# Patient Record
Sex: FEMALE | Race: WHITE
[De-identification: names, ages, dates, MRNs, and addresses within clinical notes are randomized per-mention and may not be internally consistent; named-entity substitution may affect disease eponyms.]

---

## 2021-02-10 ENCOUNTER — APPOINTMENT (OUTPATIENT)
Dept: BARIATRICS/WEIGHT MGMT | Facility: CLINIC | Age: 41
End: 2021-02-10
Payer: COMMERCIAL

## 2021-02-10 DIAGNOSIS — R53.83 OTHER FATIGUE: ICD-10-CM

## 2021-02-10 DIAGNOSIS — R79.0 ABNORMAL LVL OF BLOOD MINERAL: ICD-10-CM

## 2021-02-10 PROCEDURE — 99204 OFFICE O/P NEW MOD 45 MIN: CPT | Mod: 95

## 2021-02-11 PROBLEM — R79.0 DECREASED IRON STORES: Status: ACTIVE | Noted: 2021-02-11

## 2021-02-11 PROBLEM — R53.83 OTHER FATIGUE: Status: ACTIVE | Noted: 2021-02-11

## 2021-02-20 LAB
25(OH)D3 SERPL-MCNC: 34.1 NG/ML
ALBUMIN SERPL ELPH-MCNC: 4.4 G/DL
ALP BLD-CCNC: 53 U/L
ALT SERPL-CCNC: 10 U/L
ANION GAP SERPL CALC-SCNC: 10 MMOL/L
AST SERPL-CCNC: 17 U/L
BASOPHILS # BLD AUTO: 0.02 K/UL
BASOPHILS NFR BLD AUTO: 0.4 %
BILIRUB SERPL-MCNC: 0.2 MG/DL
BUN SERPL-MCNC: 13 MG/DL
CALCIUM SERPL-MCNC: 8.9 MG/DL
CHLORIDE SERPL-SCNC: 105 MMOL/L
CHOLEST SERPL-MCNC: 138 MG/DL
CO2 SERPL-SCNC: 25 MMOL/L
CREAT SERPL-MCNC: 0.77 MG/DL
CRP SERPL HS-MCNC: 3.04 MG/L
EOSINOPHIL # BLD AUTO: 0.06 K/UL
EOSINOPHIL NFR BLD AUTO: 1.1 %
FERRITIN SERPL-MCNC: 52 NG/ML
GLUCOSE SERPL-MCNC: 97 MG/DL
HCT VFR BLD CALC: 38 %
HDLC SERPL-MCNC: 64 MG/DL
HGB BLD-MCNC: 11.9 G/DL
IMM GRANULOCYTES NFR BLD AUTO: 0.2 %
INSULIN SERPL-MCNC: 9.9 UU/ML
IRON SATN MFR SERPL: 15 %
IRON SERPL-MCNC: 51 UG/DL
LDLC SERPL CALC-MCNC: 61 MG/DL
LYMPHOCYTES # BLD AUTO: 1.83 K/UL
LYMPHOCYTES NFR BLD AUTO: 32.4 %
MAN DIFF?: NORMAL
MCHC RBC-ENTMCNC: 30.2 PG
MCHC RBC-ENTMCNC: 31.3 GM/DL
MCV RBC AUTO: 96.4 FL
MONOCYTES # BLD AUTO: 0.56 K/UL
MONOCYTES NFR BLD AUTO: 9.9 %
NEUTROPHILS # BLD AUTO: 3.16 K/UL
NEUTROPHILS NFR BLD AUTO: 56 %
NONHDLC SERPL-MCNC: 74 MG/DL
PLATELET # BLD AUTO: 241 K/UL
POTASSIUM SERPL-SCNC: 4.5 MMOL/L
PROT SERPL-MCNC: 6.9 G/DL
RBC # BLD: 3.94 M/UL
RBC # FLD: 13.1 %
SODIUM SERPL-SCNC: 140 MMOL/L
T3FREE SERPL-MCNC: 2.77 PG/ML
T4 FREE SERPL-MCNC: 0.9 NG/DL
THYROGLOB AB SERPL-ACNC: <20 IU/ML
THYROPEROXIDASE AB SERPL IA-ACNC: 41.3 IU/ML
TIBC SERPL-MCNC: 329 UG/DL
TRIGL SERPL-MCNC: 66 MG/DL
TSH SERPL-ACNC: 4.5 UIU/ML
UIBC SERPL-MCNC: 278 UG/DL
WBC # FLD AUTO: 5.64 K/UL

## 2021-03-03 ENCOUNTER — APPOINTMENT (OUTPATIENT)
Dept: BARIATRICS/WEIGHT MGMT | Facility: CLINIC | Age: 41
End: 2021-03-03
Payer: COMMERCIAL

## 2021-03-03 PROCEDURE — 99214 OFFICE O/P EST MOD 30 MIN: CPT | Mod: 95

## 2021-03-08 ENCOUNTER — OUTPATIENT (OUTPATIENT)
Dept: OUTPATIENT SERVICES | Facility: HOSPITAL | Age: 41
LOS: 1 days | End: 2021-03-08
Payer: COMMERCIAL

## 2021-03-08 ENCOUNTER — APPOINTMENT (OUTPATIENT)
Dept: ULTRASOUND IMAGING | Facility: IMAGING CENTER | Age: 41
End: 2021-03-08
Payer: COMMERCIAL

## 2021-03-08 DIAGNOSIS — R94.6 ABNORMAL RESULTS OF THYROID FUNCTION STUDIES: ICD-10-CM

## 2021-03-08 PROCEDURE — 76536 US EXAM OF HEAD AND NECK: CPT

## 2021-03-08 PROCEDURE — 76536 US EXAM OF HEAD AND NECK: CPT | Mod: 26

## 2021-03-11 ENCOUNTER — TRANSCRIPTION ENCOUNTER (OUTPATIENT)
Age: 41
End: 2021-03-11

## 2021-03-28 NOTE — ASSESSMENT
[FreeTextEntry1] : 39 yo woman with class I obesity\par \par Recommend the following:\par check metabolic labs\par whole foods based eating strategy limiting refined carbs and added fats - recommend plant leaning approach\par ok to maintain 6-10 hour eating window but recommend being consistent with timing\par keep food journal\par track daily activity\par more moderate intensity cardio\par would like to avoid pharmacotherapy at this time\par rtc in 3 weeks.\par

## 2021-03-28 NOTE — HISTORY OF PRESENT ILLNESS
[Home] : at home, [unfilled] , at the time of the visit. [Other Location: e.g. Home (Enter Location, City,State)___] : at [unfilled] [Verbal consent obtained from patient] : the patient, [unfilled] [FreeTextEntry1] : 41 yo woman presents for initial eval and mgt of overweight/obesity\par \par She reports struggling with her weight in particular since having 3rd baby 2 years ago.  \par \par currently at her lifetime non-pregnant max weight of 185 lbs.  She is 5'7.  \par \par Now she is trying to engage in intermittent fasting.  She has a shake in the morning a light lunch/dinner\par She tends to snack in the afternoon on cookies or apple/pb \par Does eat out/order in weekly\par \par Sleep generally is ok although has been difficult over the past several weeks\par \par She engages in daily physical activity, walking, spinning strength training. spends more time of resistance work than cardio.  \par \par \par Please refer to intake forms for complete weight and related history.\par

## 2021-03-31 ENCOUNTER — APPOINTMENT (OUTPATIENT)
Dept: BARIATRICS/WEIGHT MGMT | Facility: CLINIC | Age: 41
End: 2021-03-31
Payer: COMMERCIAL

## 2021-03-31 PROCEDURE — 99213 OFFICE O/P EST LOW 20 MIN: CPT | Mod: 95

## 2021-03-31 NOTE — HISTORY OF PRESENT ILLNESS
[FreeTextEntry1] : returns for f/u\par \par now on synthroid 25mcg.  reports subtle increase in energy.  also sleeping better, though.\par continues with 30 minutes of exercise (spin, etc) most days of the week\par additionally walking 20 minutes per day to mimic commute to work as she is working from home\par eating window has been 10:30-6:30 and she is focusing mainly on whole foods\par protein shake + fruit/greens in AM (will be going back to some steel cut oats soon occasionally)\par big salad for lunch\par dinner more of a challenge as she eats with family but aims for whole foods with portion control\par not snacking now\par IS now down to 178 lbs (8 lb weight loss in past 6 weeks) and is happy with progress

## 2021-03-31 NOTE — ASSESSMENT
[FreeTextEntry1] : doing well with low dose thyroid replacement plus lifetyle measures\par \par recommend:\par 1. cont synthroid 25mcg and recheck thyroid labs in 4 more weeks\par 2. cont with 8 hour eating window\par 3. cont with whole foods\par 4. daily exercise  + walking\par \par rtc in 1 month

## 2021-04-05 NOTE — HISTORY OF PRESENT ILLNESS
[FreeTextEntry1] : returns for f/u\par \par trying to be concious of food choices\par had a month of "celebrations" making it hard to be universally strict\par trying to get extra steps\par has lost 2 lbs in last week\par 183 lbs now for the first time since pre-pregnancy\par \par of note labs showed positive anti TPO antibodies with elevated TSH\par \par

## 2021-04-05 NOTE — ASSESSMENT
[FreeTextEntry1] : recommend the following:\par \par autoimmune thyroiditis:\par obtain thyroid u/s and if consistent with thyroiditis will start 25mcg of synthroid\par \par obesity:\par cont with dietary recommendation\par cont regular exercise\par increase day to day steps\par keep food activity journal\par \par rtc in 4 weeks.

## 2021-04-28 ENCOUNTER — APPOINTMENT (OUTPATIENT)
Dept: BARIATRICS/WEIGHT MGMT | Facility: CLINIC | Age: 41
End: 2021-04-28
Payer: COMMERCIAL

## 2021-04-28 PROCEDURE — 99213 OFFICE O/P EST LOW 20 MIN: CPT | Mod: 95

## 2021-04-29 LAB
T3 SERPL-MCNC: 111 NG/DL
T3FREE SERPL-MCNC: 2.81 PG/ML
T4 FREE SERPL-MCNC: 1.2 NG/DL
T4 SERPL-MCNC: 7.2 UG/DL
TSH SERPL-ACNC: 2.45 UIU/ML

## 2021-05-03 NOTE — HISTORY OF PRESENT ILLNESS
[FreeTextEntry1] : returns for f/u\par \par remains on synthroid 25mcg. \par feels about the same as last month. \par continues with 30 minutes of exercise (spin, etc) most days of the week\par additionally walking 20 minutes per day to mimic commute to work as she is working from home\par eating window has been 10:30-6:30 and she is focusing mainly on whole foods\par eating window has been about 8 hours\par weight is now 175 (subtle weight loss from last visit)\par no new complaints today

## 2021-05-03 NOTE — ASSESSMENT
[FreeTextEntry1] : doing well with low dose thyroid replacement plus lifetyle measures\par \par recommend:\par 1. cont synthroid 25mcg and will monitor symptoms and labs\par 2. cont with 8 hour eating window\par 3. cont with whole foods\par 4. daily exercise  + walking\par \par rtc in 1 month

## 2021-05-26 ENCOUNTER — APPOINTMENT (OUTPATIENT)
Dept: BARIATRICS/WEIGHT MGMT | Facility: CLINIC | Age: 41
End: 2021-05-26
Payer: COMMERCIAL

## 2021-05-26 PROCEDURE — 99213 OFFICE O/P EST LOW 20 MIN: CPT | Mod: 95

## 2021-05-30 NOTE — REASON FOR VISIT
[Follow-Up Visit] : a follow-up visit for [Obesity] : obesity [Home] : at home, [unfilled] , at the time of the visit. [Other Location: e.g. Home (Enter Location, City,State)___] : at [unfilled] [Verbal consent obtained from patient] : the patient, [unfilled]

## 2021-05-30 NOTE — HISTORY OF PRESENT ILLNESS
[FreeTextEntry1] : returns for f/u\par \par remains on synthroid 25mcg. \par modest weight loss since last visit\par has been a busy month\par keeping 8 hour window on weekdays\par still struggling some with food quality for dinner\par no new complaints today

## 2021-05-30 NOTE — ASSESSMENT
[FreeTextEntry1] : doing well with low dose thyroid replacement plus lifetyle measures\par \par recommend:\par 1. cont synthroid 25mcg and will monitor symptoms and labs\par 2. cont with 8 hour eating window\par 3. cont with whole foods\par 4. daily exercise  + walking\par 5. recheck thyroid labs\par \par rtc in 1 month

## 2021-06-28 LAB
ALBUMIN SERPL ELPH-MCNC: 4.8 G/DL
ALP BLD-CCNC: 56 U/L
ALT SERPL-CCNC: 17 U/L
ANION GAP SERPL CALC-SCNC: 12 MMOL/L
AST SERPL-CCNC: 21 U/L
BASOPHILS # BLD AUTO: 0.03 K/UL
BASOPHILS NFR BLD AUTO: 0.4 %
BILIRUB SERPL-MCNC: 0.3 MG/DL
BUN SERPL-MCNC: 15 MG/DL
CALCIUM SERPL-MCNC: 9.4 MG/DL
CHLORIDE SERPL-SCNC: 106 MMOL/L
CHOLEST SERPL-MCNC: 184 MG/DL
CO2 SERPL-SCNC: 23 MMOL/L
CREAT SERPL-MCNC: 0.74 MG/DL
CRP SERPL HS-MCNC: 3.37 MG/L
EOSINOPHIL # BLD AUTO: 0.04 K/UL
EOSINOPHIL NFR BLD AUTO: 0.6 %
ESTIMATED AVERAGE GLUCOSE: 108 MG/DL
GLUCOSE SERPL-MCNC: 86 MG/DL
HBA1C MFR BLD HPLC: 5.4 %
HCT VFR BLD CALC: 39.4 %
HDLC SERPL-MCNC: 83 MG/DL
HGB BLD-MCNC: 12.4 G/DL
IMM GRANULOCYTES NFR BLD AUTO: 0.3 %
INSULIN SERPL-MCNC: 6.6 UU/ML
LDLC SERPL CALC-MCNC: 89 MG/DL
LYMPHOCYTES # BLD AUTO: 1.77 K/UL
LYMPHOCYTES NFR BLD AUTO: 25 %
MAN DIFF?: NORMAL
MCHC RBC-ENTMCNC: 29.9 PG
MCHC RBC-ENTMCNC: 31.5 GM/DL
MCV RBC AUTO: 94.9 FL
MONOCYTES # BLD AUTO: 0.55 K/UL
MONOCYTES NFR BLD AUTO: 7.8 %
NEUTROPHILS # BLD AUTO: 4.68 K/UL
NEUTROPHILS NFR BLD AUTO: 65.9 %
NONHDLC SERPL-MCNC: 101 MG/DL
PLATELET # BLD AUTO: 260 K/UL
POTASSIUM SERPL-SCNC: 4.8 MMOL/L
PROT SERPL-MCNC: 7.2 G/DL
RBC # BLD: 4.15 M/UL
RBC # FLD: 13.3 %
SODIUM SERPL-SCNC: 140 MMOL/L
T3FREE SERPL-MCNC: 2.74 PG/ML
T4 FREE SERPL-MCNC: 1.2 NG/DL
TRIGL SERPL-MCNC: 58 MG/DL
TSH SERPL-ACNC: 1.74 UIU/ML
WBC # FLD AUTO: 7.09 K/UL

## 2021-06-30 ENCOUNTER — APPOINTMENT (OUTPATIENT)
Dept: BARIATRICS/WEIGHT MGMT | Facility: CLINIC | Age: 41
End: 2021-06-30
Payer: COMMERCIAL

## 2021-06-30 PROCEDURE — 99213 OFFICE O/P EST LOW 20 MIN: CPT | Mod: 95

## 2021-08-12 NOTE — ASSESSMENT
[FreeTextEntry1] : doing well with low dose thyroid replacement plus lifestyle measures\par \par recommend:\par 1. cont synthroid 25mcg \par 2. cont with 8 hour eating window\par 3. cont with whole foods - needs to focus on weekends and evenings - planning in advance\par 4. daily exercise  + walking\par \par \par rtc in 1 month

## 2021-08-12 NOTE — HISTORY OF PRESENT ILLNESS
[FreeTextEntry1] : returns for f/u\par \par remains on synthroid 25mcg. \par thyroid labs ok\par daytime baseline eating ok but still issues with nights and weekends\par weight stable since last visit\par keeping 8 hour window on weekdays\par \par no new complaints today

## 2021-09-17 ENCOUNTER — APPOINTMENT (OUTPATIENT)
Dept: BARIATRICS/WEIGHT MGMT | Facility: CLINIC | Age: 41
End: 2021-09-17
Payer: COMMERCIAL

## 2021-09-17 PROCEDURE — 99213 OFFICE O/P EST LOW 20 MIN: CPT | Mod: 95

## 2021-10-07 NOTE — ASSESSMENT
[FreeTextEntry1] : 5 lb sustained weight loss total but some recent weight gain with going back to work.\par \par \par recommend:\par 1. cont synthroid 25mcg - recheck thyroid labs soon\par 2. try to stick to 8 hour eating window, particularly earlier in the day\par 3. try to bring food with her\par 4. needs to focus on evening and weekend eating\par 5. daily exercise as feasible\par \par rtc in 1 month

## 2021-10-07 NOTE — HISTORY OF PRESENT ILLNESS
[FreeTextEntry1] : returns for f/u\par \par remains on synthroid 25mcg. \par she has returned to work\par a little less physical activity\par a little less control of eating\par weight has increased some to 180 lbs\par otherwise without new complaints.\par

## 2021-10-22 ENCOUNTER — APPOINTMENT (OUTPATIENT)
Dept: BARIATRICS/WEIGHT MGMT | Facility: CLINIC | Age: 41
End: 2021-10-22
Payer: COMMERCIAL

## 2021-10-22 PROCEDURE — 99213 OFFICE O/P EST LOW 20 MIN: CPT | Mod: 95

## 2021-10-22 NOTE — ASSESSMENT
[FreeTextEntry1] : Had lost 10 lbs but has subsequently regained 7 of those pounds mainly over the past few months when doing more commuting back to office\par \par \par recommend:\par 1. cont synthroid 25mcg - recheck thyroid labs \par 2. recommend a WFPB approach to eating\par 3. cont to track food\par 4. daily exercise to be maxed out as feasible though understandable time limitations\par \par rtc in 1 month

## 2021-10-22 NOTE — HISTORY OF PRESENT ILLNESS
[FreeTextEntry1] : returns for f/u\par \par remains on synthroid 25mcg. 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  planning out dinner and keeping track on phone tommy\par nonetheless her weight is up to 182 lbs.\par \par otherwise without new complaints.\par

## 2022-01-26 ENCOUNTER — APPOINTMENT (OUTPATIENT)
Dept: INTERNAL MEDICINE | Facility: CLINIC | Age: 42
End: 2022-01-26
Payer: COMMERCIAL

## 2022-01-26 VITALS
OXYGEN SATURATION: 99 % | HEIGHT: 67 IN | HEART RATE: 74 BPM | WEIGHT: 188 LBS | BODY MASS INDEX: 29.51 KG/M2 | SYSTOLIC BLOOD PRESSURE: 114 MMHG | TEMPERATURE: 97.8 F | DIASTOLIC BLOOD PRESSURE: 71 MMHG

## 2022-01-26 DIAGNOSIS — Z78.9 OTHER SPECIFIED HEALTH STATUS: ICD-10-CM

## 2022-01-26 DIAGNOSIS — Z00.00 ENCOUNTER FOR GENERAL ADULT MEDICAL EXAMINATION W/OUT ABNORMAL FINDINGS: ICD-10-CM

## 2022-01-26 DIAGNOSIS — Z82.49 FAMILY HISTORY OF ISCHEMIC HEART DISEASE AND OTHER DISEASES OF THE CIRCULATORY SYSTEM: ICD-10-CM

## 2022-01-26 PROCEDURE — 99385 PREV VISIT NEW AGE 18-39: CPT | Mod: 25

## 2022-01-26 PROCEDURE — G0444 DEPRESSION SCREEN ANNUAL: CPT | Mod: 59

## 2022-01-26 NOTE — ASSESSMENT
[FreeTextEntry1] : annual exam- well adult\par screening and labs ordered\par further recs pending labs\par \par FH autoimmune diseases\par will check sridahr and RA\par \par hypothyroid- will check tsh and cont meds\par \par

## 2022-01-26 NOTE — HISTORY OF PRESENT ILLNESS
[FreeTextEntry1] : Annual exam [de-identified] : Annual exam\par PMHx hypothyroid\par on levothyroxine 25 mcg\par FH lupus\par

## 2022-01-26 NOTE — HEALTH RISK ASSESSMENT
[Fair] : ~his/her~ current health as fair  [Good] : ~his/her~  mood as  good [Never] : Never [Yes] : Yes [2 - 4 times a month (2 pts)] : 2-4 times a month (2 points) [3 or 4 (1 pt)] : 3 or 4  (1 point) [Less than monthly (1 pt)] : Less than monthly (1 point) [No] : In the past 12 months have you used drugs other than those required for medical reasons? No [No falls in past year] : Patient reported no falls in the past year [0] : 2) Feeling down, depressed, or hopeless: Not at all (0) [Patient reported PAP Smear was normal] : Patient reported PAP Smear was normal [HIV test declined] : HIV test declined [Hepatitis C test declined] : Hepatitis C test declined [With Family] : lives with family [# of Members in Household ___] :  household currently consist of [unfilled] member(s) [Employed] : employed [] :  [# Of Children ___] : has [unfilled] children [Feels Safe at Home] : Feels safe at home [Reports changes in vision] : Reports changes in vision [Smoke Detector] : smoke detector [Carbon Monoxide Detector] : carbon monoxide detector [Seat Belt] :  uses seat belt [Sunscreen] : uses sunscreen [PHQ-2 Negative - No further assessment needed] : PHQ-2 Negative - No further assessment needed [Audit-CScore] : 4 [de-identified] : Exercise  [de-identified] : Healthy [[id number]] : 0 [Reports changes in hearing] : Reports no changes in hearing [Reports changes in dental health] : Reports no changes in dental health [MammogramDate] : 08/21 [PapSmearDate] : 04/21

## 2022-01-28 LAB
25(OH)D3 SERPL-MCNC: 38.1 NG/ML
ALBUMIN SERPL ELPH-MCNC: 5.3 G/DL
ALP BLD-CCNC: 54 U/L
ALT SERPL-CCNC: 16 U/L
ANION GAP SERPL CALC-SCNC: 14 MMOL/L
AST SERPL-CCNC: 26 U/L
BASOPHILS # BLD AUTO: 0.03 K/UL
BASOPHILS NFR BLD AUTO: 0.5 %
BILIRUB SERPL-MCNC: 0.3 MG/DL
BUN SERPL-MCNC: 13 MG/DL
CALCIUM SERPL-MCNC: 9.7 MG/DL
CHLORIDE SERPL-SCNC: 105 MMOL/L
CHOLEST SERPL-MCNC: 169 MG/DL
CO2 SERPL-SCNC: 23 MMOL/L
CREAT SERPL-MCNC: 0.79 MG/DL
EOSINOPHIL # BLD AUTO: 0.07 K/UL
EOSINOPHIL NFR BLD AUTO: 1.1 %
ESTIMATED AVERAGE GLUCOSE: 108 MG/DL
FERRITIN SERPL-MCNC: 89 NG/ML
FOLATE SERPL-MCNC: >20 NG/ML
GLUCOSE SERPL-MCNC: 86 MG/DL
HBA1C MFR BLD HPLC: 5.4 %
HCT VFR BLD CALC: 41 %
HDLC SERPL-MCNC: 74 MG/DL
HGB BLD-MCNC: 12.9 G/DL
IMM GRANULOCYTES NFR BLD AUTO: 0.3 %
IRON SATN MFR SERPL: 21 %
IRON SERPL-MCNC: 81 UG/DL
LDLC SERPL CALC-MCNC: 84 MG/DL
LYMPHOCYTES # BLD AUTO: 1.76 K/UL
LYMPHOCYTES NFR BLD AUTO: 27.1 %
MAN DIFF?: NORMAL
MCHC RBC-ENTMCNC: 30.5 PG
MCHC RBC-ENTMCNC: 31.5 GM/DL
MCV RBC AUTO: 96.9 FL
MONOCYTES # BLD AUTO: 0.55 K/UL
MONOCYTES NFR BLD AUTO: 8.5 %
NEUTROPHILS # BLD AUTO: 4.07 K/UL
NEUTROPHILS NFR BLD AUTO: 62.5 %
NONHDLC SERPL-MCNC: 95 MG/DL
PLATELET # BLD AUTO: 265 K/UL
POTASSIUM SERPL-SCNC: 5 MMOL/L
PROT SERPL-MCNC: 7.5 G/DL
RBC # BLD: 4.23 M/UL
RBC # FLD: 13.1 %
RHEUMATOID FACT SER QL: <10 IU/ML
SODIUM SERPL-SCNC: 141 MMOL/L
TIBC SERPL-MCNC: 391 UG/DL
TRIGL SERPL-MCNC: 57 MG/DL
TSH SERPL-ACNC: 2.35 UIU/ML
UIBC SERPL-MCNC: 310 UG/DL
VIT B12 SERPL-MCNC: 515 PG/ML
WBC # FLD AUTO: 6.5 K/UL

## 2022-02-02 LAB — ANA SER IF-ACNC: NEGATIVE

## 2022-04-19 ENCOUNTER — TRANSCRIPTION ENCOUNTER (OUTPATIENT)
Age: 42
End: 2022-04-19

## 2022-04-27 ENCOUNTER — APPOINTMENT (OUTPATIENT)
Dept: BARIATRICS/WEIGHT MGMT | Facility: CLINIC | Age: 42
End: 2022-04-27
Payer: COMMERCIAL

## 2022-04-27 VITALS — WEIGHT: 193 LBS | HEIGHT: 67 IN | BODY MASS INDEX: 30.29 KG/M2

## 2022-04-27 PROCEDURE — 99214 OFFICE O/P EST MOD 30 MIN: CPT | Mod: 95

## 2022-04-28 NOTE — HISTORY OF PRESENT ILLNESS
[FreeTextEntry1] : This is a 42 year old female  present for obesity followup visit. 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 Does strength classes with [employer] 2-3 days a week 20-40 minutes, no cardio.  \par May go on 10-15 min walks during lunch, but mostly sedentary. Just got a stand up desk.  \par 8000 steps on average \par \par She has 3 children 9, 6, 3 , her youngest will wake her up in the middle of the night and then she will stay awake, so her quality of sleep often poor\par \par Average 6 hours a night of sleep

## 2022-04-28 NOTE — ASSESSMENT
[FreeTextEntry1] : BARIATRIC SURGERY HISTORY: none\par OBESITY CO-MORBIDITIES: hypothyroid \par ANTI-OBESITY MEDICATIONS: none\par OBESITY MEDICATION SIDE EFFECTS: n/a \par \par Plan: \par Reviewed whole food high fiber, lean animal protein diet\par Avoid added fat, sugar, refined carbohydrates \par keep food journal \par prepare food in advance, discussed breakfast options\par front load food\par avoid snacking\par increase cardio, continue strength training \par f/u with pcp for thyroid management \par \par f/u 3-4 weeks \par

## 2022-05-18 ENCOUNTER — APPOINTMENT (OUTPATIENT)
Dept: BARIATRICS/WEIGHT MGMT | Facility: CLINIC | Age: 42
End: 2022-05-18
Payer: COMMERCIAL

## 2022-05-18 VITALS — WEIGHT: 192 LBS | HEIGHT: 67 IN | BODY MASS INDEX: 30.13 KG/M2

## 2022-05-18 PROCEDURE — 99213 OFFICE O/P EST LOW 20 MIN: CPT | Mod: 95

## 2022-05-18 NOTE — ASSESSMENT
[FreeTextEntry1] : BARIATRIC SURGERY HISTORY: none\par OBESITY CO-MORBIDITIES: hypothyroid \par ANTI-OBESITY MEDICATIONS: none\par OBESITY MEDICATION SIDE EFFECTS: n/a \par \par Plan: \par continue whole food high fiber, lean animal protein diet\par Avoid added fat, sugar, refined carbohydrates \par keep food journal \par front load food, dinner should be smallest meal \par avoid snacking\par encouraged daily physical activity \par \par f/u 3-4 weeks \par

## 2022-05-18 NOTE — HISTORY OF PRESENT ILLNESS
[Home] : at home, [unfilled] , at the time of the visit. [Other Location: e.g. Home (Enter Location, City,State)___] : at [unfilled] [Verbal consent obtained from patient] : the patient, [unfilled] [FreeTextEntry1] : This is a 42 year old female  present for obesity followup visit. PMH: obesity, hypothyroid\par \par Patient is down 1 pound \par Patient went away this past weekend to [place], returned Sunday night \par \par She states she has made some changes, eating more whole food and eating more vegetables, dinners are her biggest struggle. \par \par Food recall: \par B: tea 1 tsp sugar, oatmeal and raisins, sweet potato with beans and corn  \par L: roasted chicken and vegetable salad \par Snack: apple, nuts, tea, banana with pb \par D: salad with chicken, spaghetti squash/steak/pasta with broccoli , burgers  \par \par Orders food 2-3x/week \par Snacking less in general \par \par When she was away, she got in 49183 steps a day. \par She is more mindful of her steps, and will go out for a walk or do a short sculpt class in the evening if her steps are short.  Continues to ride [employer] regularly \par \par Being more mindful of sleep routine as well.

## 2022-06-08 ENCOUNTER — APPOINTMENT (OUTPATIENT)
Dept: BARIATRICS/WEIGHT MGMT | Facility: CLINIC | Age: 42
End: 2022-06-08
Payer: COMMERCIAL

## 2022-06-08 VITALS — BODY MASS INDEX: 30.29 KG/M2 | HEIGHT: 67 IN | WEIGHT: 193 LBS

## 2022-06-08 PROCEDURE — 99213 OFFICE O/P EST LOW 20 MIN: CPT | Mod: 95

## 2022-06-13 NOTE — HISTORY OF PRESENT ILLNESS
[Home] : at home, [unfilled] , at the time of the visit. [Other Location: e.g. Home (Enter Location, City,State)___] : at [unfilled] [Verbal consent obtained from patient] : the patient, [unfilled] [FreeTextEntry1] : This is a 42 year old female  present for obesity followup visit. PMH: obesity, hypothyroid\par \par Patient is unchanged \par \par \par She states she continues to eat more whole food higher fiber meals, dinners are her biggest struggle. She has been packing her meals to bring to evening sports events so she does not eat in the late evening when she gets home with her children. \par Snacking less in general \par \par She is also mindful of her daily steps. Goal 10k daily

## 2022-06-13 NOTE — ASSESSMENT
[FreeTextEntry1] : BARIATRIC SURGERY HISTORY: none\par OBESITY CO-MORBIDITIES: hypothyroid \par ANTI-OBESITY MEDICATIONS: none\par OBESITY MEDICATION SIDE EFFECTS: n/a \par \par Plan: \par continue whole food high fiber, lean animal protein diet\par Avoid added fat, sugar, refined carbohydrates \par keep food journal \par front load food, dinner should be smallest meal \par avoid snacking\par continue daily physical activity \par \par f/u 3-4 weeks \par

## 2022-07-06 ENCOUNTER — APPOINTMENT (OUTPATIENT)
Dept: BARIATRICS/WEIGHT MGMT | Facility: CLINIC | Age: 42
End: 2022-07-06

## 2022-07-06 VITALS — WEIGHT: 191 LBS | HEIGHT: 67 IN | BODY MASS INDEX: 29.98 KG/M2

## 2022-07-06 PROCEDURE — 99214 OFFICE O/P EST MOD 30 MIN: CPT | Mod: 95

## 2022-07-06 NOTE — HISTORY OF PRESENT ILLNESS
[Home] : at home, [unfilled] , at the time of the visit. [Other Location: e.g. Home (Enter Location, City,State)___] : at [unfilled] [Verbal consent obtained from patient] : the patient, [unfilled] [FreeTextEntry1] : This is a 42 year old female  present for obesity followup visit. PMH: obesity, hypothyroid\par \par Patient is down 2 pounds since last visit \par \par She got down to 189 pounds until the holiday weekend\par Breakfast and lunches are going very well, dinners are more difficult. Eating out most days last week, celebrations/bbq/eating at friends house. Pizza and bbqs \par \par \par Tracking her steps, getting 12k steps most days, walking during lunches and prioritizing her exercise daily \par \par Sleep has been off. On average, 6 hours a night, and it has been broken sleep because her children have been waking her up

## 2022-07-06 NOTE — ASSESSMENT
[FreeTextEntry1] : BARIATRIC SURGERY HISTORY: none\par OBESITY CO-MORBIDITIES: hypothyroid \par ANTI-OBESITY MEDICATIONS: none\par OBESITY MEDICATION SIDE EFFECTS: n/a \par \par Plan: \par continue whole food high fiber, lean animal protein diet\par Avoid added fat, sugar, refined carbohydrates \par keep food journal \par front load food, dinner should be smallest meal \par 3 meals \par avoid snacking\par continue daily physical activity \par discussed medication options. would prefer something that would aid in appetite GLP-1 require PA and she does not have DM. would avoid stimulants due to poor sleep and hx anxiety\par start metformin, may consider topiramate \par \par f/u 3-4 weeks \par

## 2022-07-08 ENCOUNTER — RX RENEWAL (OUTPATIENT)
Age: 42
End: 2022-07-08

## 2022-07-26 ENCOUNTER — APPOINTMENT (OUTPATIENT)
Dept: BARIATRICS/WEIGHT MGMT | Facility: CLINIC | Age: 42
End: 2022-07-26

## 2022-07-26 VITALS — BODY MASS INDEX: 30.37 KG/M2 | HEIGHT: 67 IN | WEIGHT: 193.5 LBS

## 2022-07-26 PROCEDURE — 99214 OFFICE O/P EST MOD 30 MIN: CPT | Mod: 95

## 2022-07-26 NOTE — HISTORY OF PRESENT ILLNESS
[Home] : at home, [unfilled] , at the time of the visit. [Other Location: e.g. Home (Enter Location, City,State)___] : at [unfilled] [Verbal consent obtained from patient] : the patient, [unfilled] [FreeTextEntry1] : This is a 42 year old female  present for obesity followup visit. PMH: obesity, hypothyroid\par \par Patient's weight up since last visit \par She started metformin currently taking 2 tabs, tolerating well \par \par Patient states she prepares meals during the week and has been keeping a food journal but not on weekends.   weekends seem to be an issue with eating out and socializing/events, reports  6 alcoholic beverages on average over the weekends. \par Weekday meals: \par B: oatmeal with linda seeds and fruit, tea sugar and milk, acai bowl  \par L: salad with chicken \par occasional Snack: tea and almonds \par D: salad with chicken and "a few" fried cauliflower bites, baked sweet potato, pork and quac, vegan ravioli with sauce and broccoli \par \par \par Tracking her steps, getting 11k steps most days, return to her bike, riding 30 min [place] ride 3 days last week \par \par \par Sleep 5-6 hours on average

## 2022-07-26 NOTE — ASSESSMENT
[FreeTextEntry1] : BARIATRIC SURGERY HISTORY: none\par OBESITY CO-MORBIDITIES: hypothyroid \par ANTI-OBESITY MEDICATIONS: metformin\par OBESITY MEDICATION SIDE EFFECTS: none \par \par Plan: \par continue whole food high fiber, lean animal protein diet\par Avoid added fat, sugar, refined carbohydrates \par keep food journal \par front load food, dinner should be smallest meal \par 3 meals \par avoid snacking\par clean up weekend food intake, decrease/avoid alcohol intake\par continue daily physical activity, add higher intensity rides \par continue metformin 1500mg \par GLP-1 require PA and she does not have DM. would avoid stimulants due to poor sleep and hx anxiety\par  may consider topiramate \par \par f/u 3-4 weeks \par

## 2022-08-17 ENCOUNTER — APPOINTMENT (OUTPATIENT)
Dept: BARIATRICS/WEIGHT MGMT | Facility: CLINIC | Age: 42
End: 2022-08-17

## 2022-08-17 VITALS — HEIGHT: 67 IN | WEIGHT: 194 LBS | BODY MASS INDEX: 30.45 KG/M2

## 2022-08-17 PROCEDURE — 99213 OFFICE O/P EST LOW 20 MIN: CPT | Mod: 95

## 2022-08-17 NOTE — ASSESSMENT
[FreeTextEntry1] : BARIATRIC SURGERY HISTORY: none\par OBESITY CO-MORBIDITIES: hypothyroid \par ANTI-OBESITY MEDICATIONS: metformin\par OBESITY MEDICATION SIDE EFFECTS: none \par \par Plan: \par continue whole food high fiber, lean animal protein diet\par Avoid added fat, sugar, refined carbohydrates \par keep food journal \par front load food, dinner should be smallest meal \par 3 meals \par avoid snacking\par continue regular physical activity\par continue metformin 1500mg for now. She has been on medication for 6 weeks, no change in weight. Patient has been following diet and lifestyle recommendations\par will start mounjaro 2.5mg sq qweekly \par if not covered will continue 1500mg of metformin and add phentermine\par \par f/u 3-4 weeks \par

## 2022-08-17 NOTE — HISTORY OF PRESENT ILLNESS
[Home] : at home, [unfilled] , at the time of the visit. [Other Location: e.g. Home (Enter Location, City,State)___] : at [unfilled] [Verbal consent obtained from patient] : the patient, [unfilled] [FreeTextEntry1] : This is a 42 year old female  present for obesity followup visit. PMH: obesity, hypothyroid\par \par Patient's weight up since last visit \par Taking metformin 1500mg, tolerating well \par \par She states food is going well. Removing starch from dinner \par 3 meals and sometimes a snack in between \par \par \par Exercise is the same. She getting 11-12k steps most days, returned to [facility] and doing strenght training 3 days a week  \par \par \par Sleep 5-6 hours on average. Sleep had improved, but her son has kept her awake for the last 2 weeks as he has been ill.

## 2022-09-14 ENCOUNTER — APPOINTMENT (OUTPATIENT)
Dept: BARIATRICS/WEIGHT MGMT | Facility: CLINIC | Age: 42
End: 2022-09-14

## 2022-09-14 VITALS — HEIGHT: 67 IN | BODY MASS INDEX: 29.66 KG/M2 | WEIGHT: 189 LBS

## 2022-09-14 PROCEDURE — 99213 OFFICE O/P EST LOW 20 MIN: CPT | Mod: 95

## 2022-09-14 RX ORDER — METFORMIN ER 750 MG 750 MG/1
750 TABLET ORAL
Qty: 180 | Refills: 0 | Status: DISCONTINUED | COMMUNITY
Start: 2022-07-06 | End: 2022-09-14

## 2022-09-14 NOTE — REASON FOR VISIT
[Follow-Up] : a follow-up visit [Follow-Up Visit] : a follow-up visit for [Obesity] : obesity [Home] : at home, [unfilled] , at the time of the visit. [Other Location: e.g. Home (Enter Location, City,State)___] : at [unfilled] [Patient] : the patient [This encounter was initiated by telehealth (audio with video) and converted to telephone (audio only) due to technical difficulties.] : This encounter was initiated by telehealth (audio with video) and converted to telephone (audio only) due to technical difficulties.

## 2022-09-14 NOTE — ASSESSMENT
[FreeTextEntry1] : BARIATRIC SURGERY HISTORY: none\par OBESITY CO-MORBIDITIES: hypothyroid \par ANTI-OBESITY MEDICATIONS: metformin\par OBESITY MEDICATION SIDE EFFECTS: none \par \par Plan: \par continue whole food high fiber, lean animal protein diet\par Avoid added fat, sugar, refined carbohydrates \par keep food journal \par front load food, dinner should be smallest meal \par 3 meals \par avoid snacking\par remain mindful\par continue regular physical activity\par dc metformin \par continue mounjaro 2.5mg sq qweekly \par ordered blood work\par \par \par f/u 3-4 weeks \par

## 2022-09-14 NOTE — HISTORY OF PRESENT ILLNESS
[Home] : at home, [unfilled] , at the time of the visit. [Other Location: e.g. Home (Enter Location, City,State)___] : at [unfilled] [Verbal consent obtained from patient] : the patient, [unfilled] [FreeTextEntry1] : This is a 42 year old female  present for obesity followup visit. PMH: obesity, hypothyroid\par \par Patient's weight down 5 pounds \par \par She started Mounjaro 2.5mg, tolerating well\par Feels more energetic \par Appetite is well controlled, not eating second servings or finish her plate \par Eating whole food high fiber \par \par \par Exercises regularly. 10k  steps most days,  [place] and doing strength training 3 days a week  \par \par \par 6 hours on average now. Her children sleep in her bed \par Going to bed earlier

## 2022-10-12 ENCOUNTER — APPOINTMENT (OUTPATIENT)
Dept: BARIATRICS/WEIGHT MGMT | Facility: CLINIC | Age: 42
End: 2022-10-12

## 2022-10-12 VITALS — BODY MASS INDEX: 29.51 KG/M2 | WEIGHT: 188 LBS | HEIGHT: 67 IN

## 2022-10-12 PROCEDURE — 99213 OFFICE O/P EST LOW 20 MIN: CPT | Mod: 95

## 2022-10-12 NOTE — HISTORY OF PRESENT ILLNESS
[FreeTextEntry1] : This is a 42 year old female  present for obesity followup visit. PMH: obesity, hypothyroid\par \par Patient's weight down 2 pounds \par \par She continues Mounjaro 2.5mg, tolerating well\par Appetite is not as well controlled but she states it's ok \par It has been a stressful month: busy with work, 2 weddings and a vacation \par \par \par conitnues to exercise regularly. 10k  steps most days,  [place] and doing strength training\par \par \par 6 hours on average now. Her children sleep in her bed \par Going to bed earlier

## 2022-10-12 NOTE — REASON FOR VISIT
[Follow-Up] : a follow-up visit [Home] : at home, [unfilled] , at the time of the visit. [Other Location: e.g. Home (Enter Location, City,State)___] : at [unfilled] [Patient] : the patient [This encounter was initiated by telehealth (audio with video) and converted to telephone (audio only) due to technical difficulties.] : This encounter was initiated by telehealth (audio with video) and converted to telephone (audio only) due to technical difficulties. [Follow-Up Visit] : a follow-up visit for [Obesity] : obesity

## 2022-10-12 NOTE — ASSESSMENT
[FreeTextEntry1] : BARIATRIC SURGERY HISTORY: none\par OBESITY CO-MORBIDITIES: hypothyroid \par ANTI-OBESITY MEDICATIONS: metformin\par OBESITY MEDICATION SIDE EFFECTS: none \par \par Plan: \par continue whole food high fiber, lean animal protein diet\par Avoid added fat, sugar, refined carbohydrates \par keep food journal \par front load food, dinner should be smallest meal \par 3 meals \par continue regular physical activity\par continue mounjaro 2.5mg sq qweekly \par discussed potentially adding phentermine in the future \par \par \par f/u 3-4 weeks \par

## 2022-10-25 ENCOUNTER — APPOINTMENT (OUTPATIENT)
Dept: INTERNAL MEDICINE | Facility: CLINIC | Age: 42
End: 2022-10-25

## 2022-10-25 VITALS
TEMPERATURE: 96.7 F | WEIGHT: 185.6 LBS | SYSTOLIC BLOOD PRESSURE: 105 MMHG | BODY MASS INDEX: 29.13 KG/M2 | HEIGHT: 67 IN | OXYGEN SATURATION: 98 % | DIASTOLIC BLOOD PRESSURE: 67 MMHG | HEART RATE: 95 BPM

## 2022-10-25 DIAGNOSIS — Z23 ENCOUNTER FOR IMMUNIZATION: ICD-10-CM

## 2022-10-25 DIAGNOSIS — Z13.220 ENCOUNTER FOR SCREENING FOR LIPOID DISORDERS: ICD-10-CM

## 2022-10-25 PROCEDURE — G0008: CPT

## 2022-10-25 PROCEDURE — 36415 COLL VENOUS BLD VENIPUNCTURE: CPT

## 2022-10-25 PROCEDURE — 90686 IIV4 VACC NO PRSV 0.5 ML IM: CPT

## 2022-10-25 PROCEDURE — 99214 OFFICE O/P EST MOD 30 MIN: CPT | Mod: 25

## 2022-10-25 NOTE — HEALTH RISK ASSESSMENT
[Never] : Never [Yes] : Yes [2 - 4 times a month (2 pts)] : 2-4 times a month (2 points) [1 or 2 (0 pts)] : 1 or 2 (0 points) [Never (0 pts)] : Never (0 points) [No] : In the past 12 months have you used drugs other than those required for medical reasons? No [No falls in past year] : Patient reported no falls in the past year [0] : 2) Feeling down, depressed, or hopeless: Not at all (0) [de-identified] : none [de-identified] : breast doctor [Audit-CScore] : 2 [de-identified] : exercise [de-identified] : healthy

## 2022-10-25 NOTE — HISTORY OF PRESENT ILLNESS
[FreeTextEntry1] : follow up [de-identified] : follow up\par \par hypothyroid on levothyroxine 25mcgs\par \par has been seeing weight loss team at [facility] and now on mounjaro\par

## 2022-10-25 NOTE — ASSESSMENT
[FreeTextEntry1] : hypothyroid- will recheck levels then send meds if adjustment needed\par \par \par obesity- going to weight loss management \par now on moujaro and lsing weight and is exercising\par \par \par Blood work drawn in office today\par \par \par flu shot today\par \par

## 2022-10-28 LAB
ALBUMIN SERPL ELPH-MCNC: 4.6 G/DL
ALP BLD-CCNC: 69 U/L
ALT SERPL-CCNC: 9 U/L
ANION GAP SERPL CALC-SCNC: 11 MMOL/L
AST SERPL-CCNC: 18 U/L
BASOPHILS # BLD AUTO: 0.04 K/UL
BASOPHILS NFR BLD AUTO: 0.6 %
BILIRUB SERPL-MCNC: 0.3 MG/DL
BUN SERPL-MCNC: 14 MG/DL
CALCIUM SERPL-MCNC: 9.1 MG/DL
CHLORIDE SERPL-SCNC: 103 MMOL/L
CHOLEST SERPL-MCNC: 175 MG/DL
CO2 SERPL-SCNC: 25 MMOL/L
CREAT SERPL-MCNC: 0.72 MG/DL
EGFR: 108 ML/MIN/1.73M2
EOSINOPHIL # BLD AUTO: 0.16 K/UL
EOSINOPHIL NFR BLD AUTO: 2.2 %
ESTIMATED AVERAGE GLUCOSE: 105 MG/DL
FOLATE SERPL-MCNC: 12.5 NG/ML
GLUCOSE SERPL-MCNC: 85 MG/DL
HBA1C MFR BLD HPLC: 5.3 %
HCT VFR BLD CALC: 40.3 %
HDLC SERPL-MCNC: 73 MG/DL
HGB BLD-MCNC: 12.7 G/DL
IMM GRANULOCYTES NFR BLD AUTO: 0.3 %
IRON SATN MFR SERPL: 31 %
IRON SERPL-MCNC: 108 UG/DL
LDLC SERPL CALC-MCNC: 83 MG/DL
LYMPHOCYTES # BLD AUTO: 2.16 K/UL
LYMPHOCYTES NFR BLD AUTO: 29.9 %
MAN DIFF?: NORMAL
MCHC RBC-ENTMCNC: 30.1 PG
MCHC RBC-ENTMCNC: 31.5 GM/DL
MCV RBC AUTO: 95.5 FL
MONOCYTES # BLD AUTO: 0.49 K/UL
MONOCYTES NFR BLD AUTO: 6.8 %
NEUTROPHILS # BLD AUTO: 4.36 K/UL
NEUTROPHILS NFR BLD AUTO: 60.2 %
NONHDLC SERPL-MCNC: 102 MG/DL
PLATELET # BLD AUTO: 315 K/UL
POTASSIUM SERPL-SCNC: 4.6 MMOL/L
PROT SERPL-MCNC: 7.4 G/DL
RBC # BLD: 4.22 M/UL
RBC # FLD: 13.2 %
SODIUM SERPL-SCNC: 139 MMOL/L
TIBC SERPL-MCNC: 347 UG/DL
TRIGL SERPL-MCNC: 92 MG/DL
TSH SERPL-ACNC: 2.92 UIU/ML
UIBC SERPL-MCNC: 239 UG/DL
VIT B12 SERPL-MCNC: 329 PG/ML
WBC # FLD AUTO: 7.23 K/UL

## 2022-11-07 ENCOUNTER — APPOINTMENT (OUTPATIENT)
Dept: BARIATRICS/WEIGHT MGMT | Facility: CLINIC | Age: 42
End: 2022-11-07

## 2022-11-07 PROCEDURE — 99213 OFFICE O/P EST LOW 20 MIN: CPT | Mod: 95

## 2022-11-07 NOTE — HISTORY OF PRESENT ILLNESS
[FreeTextEntry1] : This is a 42 year old female  present for obesity followup visit. PMH: obesity, hypothyroid\par \par Patient's weight down 2 pounds \par Clothing fitting better \par \par She continues Mounjaro 2.5mg, tolerating well. Noticed some injection site swelling and irritation. She states it lasts for about 2 days. Denies pain, itching, discharge\par having smaller portions, not going for seconds \par \par Physically active most days \par Walking/running/pelaton/strength \par 10k steps daily on average \par \par \par Sleep has improved, on average 6-7 hours of sleep a night

## 2022-11-07 NOTE — ASSESSMENT
[FreeTextEntry1] : BARIATRIC SURGERY HISTORY: none\par OBESITY CO-MORBIDITIES: hypothyroid \par ANTI-OBESITY MEDICATIONS:mounjaro \par OBESITY MEDICATION SIDE EFFECTS: none \par \par Plan: \par continue whole food high fiber, lean animal protein diet\par Avoid added fat, sugar, refined carbohydrates \par keep food journal \par front load food, dinner should be smallest meal \par 3 meals \par continue regular physical activity\par increase mounjaro to 5mg sq qweekly \par instructed to leave pen out of fridge for 10-15 min prior to injection to avoid irritation. can also clean site with soap and water\par \par \par \par f/u 3-4 weeks \par

## 2022-11-21 ENCOUNTER — TRANSCRIPTION ENCOUNTER (OUTPATIENT)
Age: 42
End: 2022-11-21

## 2022-11-30 ENCOUNTER — APPOINTMENT (OUTPATIENT)
Dept: BARIATRICS/WEIGHT MGMT | Facility: CLINIC | Age: 42
End: 2022-11-30

## 2022-11-30 VITALS — BODY MASS INDEX: 29.06 KG/M2 | HEIGHT: 67 IN | WEIGHT: 185.13 LBS

## 2022-11-30 PROCEDURE — 99213 OFFICE O/P EST LOW 20 MIN: CPT | Mod: 95

## 2022-11-30 NOTE — HISTORY OF PRESENT ILLNESS
[FreeTextEntry1] : This is a 42 year old female  present for obesity followup visit. PMH: obesity, hypothyroid\par \par Patient's weight unchanged \par She states there were multiple nights of holiday dinners and she felt she did well. \par Limiting food intake per meal and minimal/no dessert. States she might have had more alcohol then she should have. \par There are multiple events planned for december\par \par She continues Mounjaro 2.5mg, tolerating well. Noticed some injection site swelling and irritation, but improving \par only lasting 1-2 days \par \par Less exercise last week so she is making it a point this week walk more and add 20 min strength training \par

## 2022-11-30 NOTE — REASON FOR VISIT
[Follow-Up] : a follow-up visit [Home] : at home, [unfilled] , at the time of the visit. [Other Location: e.g. Home (Enter Location, City,State)___] : at [unfilled] [Patient] : the patient [Follow-Up Visit] : a follow-up visit for [Obesity] : obesity

## 2022-11-30 NOTE — ASSESSMENT
[FreeTextEntry1] : BARIATRIC SURGERY HISTORY: none\par OBESITY CO-MORBIDITIES: hypothyroid \par ANTI-OBESITY MEDICATIONS:mounjaro \par OBESITY MEDICATION SIDE EFFECTS: none \par \par Plan: \par continue whole food high fiber, lean animal protein diet\par Avoid added fat, sugar, refined carbohydrates \par keep food journal \par front load food, dinner should be smallest meal \par 3 meals \par return to regular physical activity and maintain during holiday season \par discussed plan for meals during events \par continue mounjaro  2.5mg sq qweekly, can apply ice for 15 min after injection. Mounjaro may no longer be covered, will try ozempic 0.25mg qweekly \par \par \par \par f/u 3-4 weeks \par

## 2022-12-28 ENCOUNTER — APPOINTMENT (OUTPATIENT)
Dept: BARIATRICS/WEIGHT MGMT | Facility: CLINIC | Age: 42
End: 2022-12-28

## 2022-12-28 VITALS — WEIGHT: 191 LBS | HEIGHT: 67 IN | BODY MASS INDEX: 29.98 KG/M2

## 2022-12-28 PROCEDURE — 99213 OFFICE O/P EST LOW 20 MIN: CPT | Mod: 95

## 2022-12-28 RX ORDER — LIRAGLUTIDE 6 MG/ML
18 INJECTION, SOLUTION SUBCUTANEOUS
Qty: 1 | Refills: 2 | Status: DISCONTINUED | COMMUNITY
Start: 2022-12-02 | End: 2022-12-28

## 2022-12-28 RX ORDER — SEMAGLUTIDE 1.34 MG/ML
2 INJECTION, SOLUTION SUBCUTANEOUS
Qty: 1 | Refills: 5 | Status: DISCONTINUED | COMMUNITY
Start: 2022-11-30 | End: 2022-12-28

## 2022-12-28 RX ORDER — TIRZEPATIDE 5 MG/.5ML
5 INJECTION, SOLUTION SUBCUTANEOUS
Qty: 4 | Refills: 5 | Status: DISCONTINUED | COMMUNITY
Start: 2022-08-17 | End: 2022-12-28

## 2022-12-28 NOTE — ASSESSMENT
[FreeTextEntry1] : BARIATRIC SURGERY HISTORY: none\par OBESITY CO-MORBIDITIES: hypothyroid \par ANTI-OBESITY MEDICATIONS:\par OBESITY MEDICATION SIDE EFFECTS: glp not covered \par \par Plan: \par continue whole food high fiber, lean animal protein diet\par Avoid added fat, sugar, refined carbohydrates \par keep food journal \par front load food, dinner should be smallest meal as early as possible \par 3 meals \par encouraged daily physical activity \par GLP-1 not covered. Will start qysmia 3.75-23 po daily . discussed potential side effects, patient verbalized understanding \par continue synthroid 25mcg. Last TSH in Oct with PCP [facility] \par \par \par \par f/u 3-4 weeks \par

## 2022-12-28 NOTE — HISTORY OF PRESENT ILLNESS
[FreeTextEntry1] : This is a 42 year old female  present for obesity followup visit. PMH: obesity, hypothyroid\par \par Patient's weight up 6 pounds, but she states she has fit into clothes she couldn't wear since last year. \par Patient did eat out, and eat late last night. \par She has been off Mounjaro for the last 2-3 weeks as it was no longer covered. \par No other injectables were covered \par \par Overall she felt she did her best during [place], not concerned about New years, no big events \par \par She joined an exercise challenge to walk or ride her pelaton every day for the next month \par \par \par

## 2023-01-12 ENCOUNTER — TRANSCRIPTION ENCOUNTER (OUTPATIENT)
Age: 43
End: 2023-01-12

## 2023-01-25 ENCOUNTER — APPOINTMENT (OUTPATIENT)
Dept: BARIATRICS/WEIGHT MGMT | Facility: CLINIC | Age: 43
End: 2023-01-25
Payer: COMMERCIAL

## 2023-01-25 VITALS — HEIGHT: 67 IN | BODY MASS INDEX: 29.44 KG/M2 | WEIGHT: 187.56 LBS

## 2023-01-25 PROCEDURE — 99213 OFFICE O/P EST LOW 20 MIN: CPT | Mod: 95

## 2023-01-25 RX ORDER — PHENTERMINE AND TOPIRAMATE 3.75; 23 MG/1; MG/1
3.75-23 CAPSULE, EXTENDED RELEASE ORAL
Qty: 30 | Refills: 0 | Status: DISCONTINUED | COMMUNITY
Start: 2022-12-30 | End: 2023-01-25

## 2023-01-25 NOTE — HISTORY OF PRESENT ILLNESS
[FreeTextEntry1] : This is a 42 year old female  present for obesity followup visit. PMH: obesity, hypothyroid\par \par Patient's weight down since last visit\par She stopped qysmia as it was making her more anxious and "snippy" \par \par Had COVID last week, so less alcohol \par \par Since the holidays are over she has decreased sugar intake in her tea and started Doing TRF- \par \par Although she has been sick, was walking 20 minutes and restarted weight training yesterday \par \par She has been working on sleep hygiene- not watching tv at night \par \par \par

## 2023-01-25 NOTE — REASON FOR VISIT
[Follow-Up] : a follow-up visit [Follow-Up Visit] : a follow-up visit for [Obesity] : obesity [Home] : at home, [unfilled] , at the time of the visit. [Other Location: e.g. Home (Enter Location, City,State)___] : at [unfilled] [Patient] : the patient

## 2023-02-22 ENCOUNTER — APPOINTMENT (OUTPATIENT)
Dept: BARIATRICS/WEIGHT MGMT | Facility: CLINIC | Age: 43
End: 2023-02-22
Payer: COMMERCIAL

## 2023-02-22 VITALS — HEIGHT: 67 IN | BODY MASS INDEX: 29.98 KG/M2 | WEIGHT: 191 LBS

## 2023-02-22 PROCEDURE — 99213 OFFICE O/P EST LOW 20 MIN: CPT | Mod: 95

## 2023-02-22 NOTE — HISTORY OF PRESENT ILLNESS
[FreeTextEntry1] : This is a 42 year old female  present for obesity followup visit. PMH: obesity, hypothyroid\par \par Patient's weight up \par She states she was able to maintain 187 pounds until Valentines day and she "fell off"  \par \par Doing TRF, starting at noon, eating 2 meals but largest meal at dinner. She states she may be overeating at night \par Denies snacking \par Drinking at least 64 ounces of water \par \par Prior to the last 2 weeks, getting 30 minutes most days walking/strength training at home\par Goal 10k steps a day \par \par Sleep is inconsistent- her kids have been sick \par \par \par \par \par

## 2023-02-22 NOTE — ASSESSMENT
[FreeTextEntry1] : BARIATRIC SURGERY HISTORY: none\par OBESITY CO-MORBIDITIES: hypothyroid \par ANTI-OBESITY MEDICATIONS: none\par OBESITY MEDICATION SIDE EFFECTS: glp not covered, qysmia- anxiety \par \par Plan: \par continue whole food high fiber, lean animal protein diet\par Avoid added fat, sugar, refined carbohydrates \par keep food journal \par front load food, dinner should be smallest meal as early as possible \par 3 meals \par encouraged daily physical activity. goal 10k, will most likely need to add higher intensity exercise as well  \par will order wegovy 0.25mg, otherwise will consider topiramate \par \par \par f/u 3-4 weeks \par

## 2023-02-23 ENCOUNTER — TRANSCRIPTION ENCOUNTER (OUTPATIENT)
Age: 43
End: 2023-02-23

## 2023-03-29 ENCOUNTER — APPOINTMENT (OUTPATIENT)
Dept: BARIATRICS/WEIGHT MGMT | Facility: CLINIC | Age: 43
End: 2023-03-29
Payer: COMMERCIAL

## 2023-03-29 VITALS — BODY MASS INDEX: 29.19 KG/M2 | HEIGHT: 67 IN | WEIGHT: 186 LBS

## 2023-03-29 DIAGNOSIS — R94.6 ABNORMAL RESULTS OF THYROID FUNCTION STUDIES: ICD-10-CM

## 2023-03-29 PROCEDURE — 99213 OFFICE O/P EST LOW 20 MIN: CPT | Mod: 95

## 2023-03-29 RX ORDER — SEMAGLUTIDE 0.25 MG/.5ML
0.25 INJECTION, SOLUTION SUBCUTANEOUS
Qty: 1 | Refills: 3 | Status: DISCONTINUED | COMMUNITY
Start: 2023-02-22 | End: 2023-03-29

## 2023-03-29 NOTE — ASSESSMENT
[FreeTextEntry1] : BARIATRIC SURGERY HISTORY: none\par OBESITY CO-MORBIDITIES: hypothyroid \par ANTI-OBESITY MEDICATIONS: none\par OBESITY MEDICATION SIDE EFFECTS: glp not covered, qysmia- anxiety \par \par Plan: \par continue whole food high fiber, lean animal protein diet\par Avoid added fat, sugar, refined carbohydrates \par continue food journal \par front load food, dinner should be smallest meal as early as possible \par 3 meals \par encouraged daily physical activity. goal 10k\par no medication at this time- may consider topiramate \par repeat thyroid panel\par \par \par f/u 3-4 weeks \par

## 2023-03-29 NOTE — HISTORY OF PRESENT ILLNESS
[FreeTextEntry1] : This is a 42 year old female  present for obesity followup visit. PMH: obesity, hypothyroid\par \par Patient's weight down 5 pounds in the last month \par \par She started keeping a food journal, more mindful of type of food and portion size  \par \par tracking steps- less steps this month \par \par She not interested in medicaiton at this time \par \par \par \par \par \par \par

## 2023-04-26 ENCOUNTER — APPOINTMENT (OUTPATIENT)
Dept: BARIATRICS/WEIGHT MGMT | Facility: CLINIC | Age: 43
End: 2023-04-26
Payer: COMMERCIAL

## 2023-04-26 VITALS — BODY MASS INDEX: 29.19 KG/M2 | WEIGHT: 186 LBS | HEIGHT: 67 IN

## 2023-04-26 PROCEDURE — 99213 OFFICE O/P EST LOW 20 MIN: CPT | Mod: 95

## 2023-04-26 NOTE — REASON FOR VISIT
[Home] : at home, [unfilled] , at the time of the visit. [Other Location: e.g. Home (Enter Location, City,State)___] : at [unfilled] [Patient] : the patient [Follow-Up] : a follow-up visit [Follow-Up Visit] : a follow-up visit for [Obesity] : obesity

## 2023-04-27 NOTE — HISTORY OF PRESENT ILLNESS
[FreeTextEntry1] : This is a 42 year old female  present for obesity followup visit. PMH: obesity, hypothyroid\par \par Patient's weight unchanged \par She went to [place], she was active while she was there but Food was a struggle, she did the best she could \par \par She is keeping a food journal\par \par She is walking most days and doing strength training 3 days a week \par 5-6 days a week 10k+ steps \par \par She not interested in medication at this time \par \par \par \par \par \par \par

## 2023-04-27 NOTE — ASSESSMENT
[FreeTextEntry1] : BARIATRIC SURGERY HISTORY: none\par OBESITY CO-MORBIDITIES: hypothyroid \par ANTI-OBESITY MEDICATIONS: none\par OBESITY MEDICATION SIDE EFFECTS: glp not covered, qysmia- anxiety \par \par Plan: \par continue whole food high fiber, lean animal protein diet\par Avoid added fat, sugar, refined carbohydrates \par continue food journal- email to me for review  \par front load food, dinner should be smallest meal as early as possible \par 3 meals \par encouraged daily physical activity. goal 10k\par no medication at this time\par \par \par f/u 3-4 weeks \par

## 2023-05-17 ENCOUNTER — TRANSCRIPTION ENCOUNTER (OUTPATIENT)
Age: 43
End: 2023-05-17

## 2023-05-17 RX ORDER — LEVOTHYROXINE SODIUM 0.03 MG/1
25 TABLET ORAL
Qty: 90 | Refills: 1 | Status: ACTIVE | COMMUNITY
Start: 2021-03-12 | End: 1900-01-01

## 2023-05-24 ENCOUNTER — APPOINTMENT (OUTPATIENT)
Dept: BARIATRICS/WEIGHT MGMT | Facility: CLINIC | Age: 43
End: 2023-05-24
Payer: COMMERCIAL

## 2023-05-24 VITALS — WEIGHT: 181 LBS | HEIGHT: 67 IN | BODY MASS INDEX: 28.41 KG/M2

## 2023-05-24 DIAGNOSIS — E88.81 METABOLIC SYNDROME: ICD-10-CM

## 2023-05-24 DIAGNOSIS — E66.9 OBESITY, UNSPECIFIED: ICD-10-CM

## 2023-05-24 PROCEDURE — 99213 OFFICE O/P EST LOW 20 MIN: CPT | Mod: 95

## 2023-05-24 NOTE — REASON FOR VISIT
[Home] : at home, [unfilled] , at the time of the visit. [Other Location: e.g. Home (Enter Location, City,State)___] : at [unfilled] [Patient] : the patient [This encounter was initiated by telehealth (audio with video) and converted to telephone (audio only) due to technical difficulties.] : This encounter was initiated by telehealth (audio with video) and converted to telephone (audio only) due to technical difficulties. [Follow-Up] : a follow-up visit [Follow-Up Visit] : a follow-up visit for [Obesity] : obesity

## 2023-05-27 PROBLEM — E66.9 ADULT-ONSET OBESITY: Status: ACTIVE | Noted: 2021-02-11

## 2023-05-27 PROBLEM — E88.81 INSULIN RESISTANCE: Status: ACTIVE | Noted: 2021-02-11

## 2023-05-27 NOTE — ASSESSMENT
[FreeTextEntry1] : BARIATRIC SURGERY HISTORY: none\par OBESITY CO-MORBIDITIES: hypothyroid \par ANTI-OBESITY MEDICATIONS: none\par OBESITY MEDICATION SIDE EFFECTS: glp not covered, qysmia- anxiety \par \par Plan: \par continue whole food high fiber, lean animal protein diet\par Avoid added fat, sugar, refined carbohydrates \par continue to avoid processed/packaged foods \par  food journal\par front load food, dinner should be smallest meal as early as possible \par 3 meals \par TRF ok \par encouraged daily physical activity. goal 10k\par no medication at this time\par \par \par f/u 3-4 weeks \par

## 2023-05-27 NOTE — HISTORY OF PRESENT ILLNESS
[FreeTextEntry1] : This is a 42 year old female  present for obesity followup visit. PMH: obesity, hypothyroid\par \par Patient has lost 5 pounds\par \par Doing trf, eating between 11-7pm \par States she has eliminated processed foods, eating more vegetables and lean animal protein\par 3 meals, no snacking \par \par 10k+ steps daily \par \par Going to bed earlier and sleeping an extra hour, 7 hours \par \par She not interested in medication at this time \par \par \par \par \par \par \par

## 2025-06-25 NOTE — END OF VISIT
[Time Spent: ___ minutes] : I have spent [unfilled] minutes of time on the encounter. [>50% of the face to face encounter time was spent on counseling and/or coordination of care for ___] : Greater than 50% of the face to face encounter time was spent on counseling and/or coordination of care for [unfilled] [Follow-Up] : a follow-up visit [Spouse] : spouse [Pre-Visit Preparation] : pre-visit preparation was done [Intercurrent Specialty/Sub-specialty Visits] : the patient has intercurrent specialty/sub-specialty visits